# Patient Record
Sex: MALE | URBAN - METROPOLITAN AREA
[De-identification: names, ages, dates, MRNs, and addresses within clinical notes are randomized per-mention and may not be internally consistent; named-entity substitution may affect disease eponyms.]

---

## 2017-09-08 ENCOUNTER — OFFICE VISIT (OUTPATIENT)
Dept: CARDIOLOGY CLINIC | Age: 62
End: 2017-09-08

## 2017-09-08 VITALS — HEART RATE: 62 BPM | DIASTOLIC BLOOD PRESSURE: 96 MMHG | HEIGHT: 71 IN | SYSTOLIC BLOOD PRESSURE: 140 MMHG

## 2017-09-08 DIAGNOSIS — I25.2 OLD MI (MYOCARDIAL INFARCTION): ICD-10-CM

## 2017-09-08 DIAGNOSIS — I10 ESSENTIAL HYPERTENSION: ICD-10-CM

## 2017-09-08 DIAGNOSIS — I25.119 CORONARY ARTERY DISEASE INVOLVING NATIVE CORONARY ARTERY OF NATIVE HEART WITH ANGINA PECTORIS (HCC): ICD-10-CM

## 2017-09-08 DIAGNOSIS — E78.5 HYPERLIPIDEMIA, UNSPECIFIED HYPERLIPIDEMIA TYPE: ICD-10-CM

## 2017-09-08 DIAGNOSIS — R07.9 CHEST PAIN, UNSPECIFIED TYPE: Primary | ICD-10-CM

## 2017-09-08 RX ORDER — METOPROLOL TARTRATE 25 MG/1
12.5 TABLET, FILM COATED ORAL 2 TIMES DAILY
COMMUNITY

## 2017-09-08 RX ORDER — AMLODIPINE BESYLATE 5 MG/1
5 TABLET ORAL DAILY
Qty: 30 TAB | Refills: 1 | Status: SHIPPED | OUTPATIENT
Start: 2017-09-08

## 2017-09-08 RX ORDER — ACETAMINOPHEN 325 MG/1
975 TABLET ORAL 2 TIMES DAILY
COMMUNITY

## 2017-09-08 RX ORDER — ISOSORBIDE MONONITRATE 30 MG/1
30 TABLET, EXTENDED RELEASE ORAL DAILY
COMMUNITY
End: 2017-09-08

## 2017-09-08 RX ORDER — ASPIRIN 81 MG/1
81 TABLET ORAL DAILY
COMMUNITY

## 2017-09-08 RX ORDER — NITROGLYCERIN 0.4 MG/1
TABLET SUBLINGUAL
COMMUNITY

## 2017-09-08 NOTE — PROGRESS NOTES
HISTORY OF PRESENT ILLNESS  Drea Kwong is a 65 y.o. male. Hospital Follow Up   The history is provided by the patient. Associated symptoms include chest pain. Pertinent negatives include no abdominal pain, no headaches and no shortness of breath. Chest Pain (Angina)    The history is provided by the patient. This is a recurrent problem. The problem has not changed since onset. The problem occurs every several days. The pain is associated with exertion and rest. The pain is present in the left side. The pain is mild. The quality of the pain is described as dull. The pain radiates to the left arm (left leg). Pertinent negatives include no abdominal pain, no claudication, no cough, no dizziness, no fever, no headaches, no hemoptysis, no nausea, no orthopnea, no palpitations, no PND, no shortness of breath, no sputum production, no vomiting and no weakness. Review of Systems   Constitutional: Negative for chills and fever. HENT: Negative for nosebleeds. Eyes: Negative for blurred vision and double vision. Respiratory: Negative for cough, hemoptysis, sputum production, shortness of breath and wheezing. Cardiovascular: Positive for chest pain. Negative for palpitations, orthopnea, claudication, leg swelling and PND. Gastrointestinal: Negative for abdominal pain, heartburn, nausea and vomiting. Musculoskeletal: Negative for myalgias. Skin: Negative for rash. Neurological: Negative for dizziness, weakness and headaches. Endo/Heme/Allergies: Does not bruise/bleed easily.      Family History   Problem Relation Age of Onset    No Known Problems Mother     No Known Problems Father        Past Medical History:   Diagnosis Date    CAD (coronary artery disease)     Myocardial infarction ([facility] [address]        Past Surgical History:   Procedure Laterality Date    HX HEART CATHETERIZATION         Social History   Substance Use Topics    Smoking status: Former Smoker     Packs/day: 0.75     Years: 40.00 Quit date: 4/8/2017    Smokeless tobacco: Never Used    Alcohol use 25.2 oz/week     42 Cans of beer per week      Comment: 6 pack/day       No Known Allergies    Prior to Admission medications    Medication Sig Start Date End Date Taking? Authorizing Provider   acetaminophen (TYLENOL) 325 mg tablet Take 975 mg by mouth two (2) times a day. Yes Historical Provider   metoprolol tartrate (LOPRESSOR) 25 mg tablet Take 12.5 mg by mouth two (2) times a day. Yes Historical Provider   nitroglycerin (NITROSTAT) 0.4 mg SL tablet by SubLINGual route every five (5) minutes as needed for Chest Pain. Yes Historical Provider   aspirin delayed-release 81 mg tablet Take 81 mg by mouth daily. Yes Historical Provider   amLODIPine (NORVASC) 5 mg tablet Take 1 Tab by mouth daily. 9/8/17  Yes Maricarmen Verdin MD         Visit Vitals    BP (!) 140/96    Pulse 62    Ht 5' 11\" (1.803 m)         Physical Exam   Constitutional: He is oriented to person, place, and time. He appears well-developed and well-nourished. HENT:   Head: Normocephalic and atraumatic. Eyes: Conjunctivae are normal.   Neck: Neck supple. No JVD present. No tracheal deviation present. No thyromegaly present. Cardiovascular: Normal rate, regular rhythm and normal heart sounds. Exam reveals no gallop and no friction rub. No murmur heard. Pulmonary/Chest: Breath sounds normal. No respiratory distress. He has no wheezes. He has no rales. He exhibits no tenderness. Abdominal: Soft. There is no tenderness. Musculoskeletal: He exhibits no edema. Neurological: He is alert and oriented to person, place, and time. Skin: Skin is warm and dry. Psychiatric: He has a normal mood and affect. Mr. Shashank Moraes has a reminder for a \"due or due soon\" health maintenance. I have asked that he contact his primary care provider for follow-up on this health maintenance.   I have personally reviewed patient's records available from hospital and other providers and incorporated findings in patient care. I have personally reviewed patients ekg done at other facility. 7/2017-sr mina,lateral t inversion  I Have personally reviewed recent relevant labs available and discussed with patient  10/2016 for atypical CP at which time he underwent cardiac catheterization without any PCI indicated-[place]    Assessment         ICD-10-CM ICD-9-CM    1. Chest pain, unspecified type R07.9 786.50 SCHEDULE NUCLEAR STUDY    atypical  / angina     2. Old MI (myocardial infarction) I25.2 412     at [place]  10/2016  cath reported no critical blockages   3. Coronary artery disease involving native coronary artery of native heart with angina pectoris ([facility] [address] I25.119 414.01      413.9     medically managed  severe headache with imdur   4. Essential hypertension I10 401.9     stable   5. Hyperlipidemia, unspecified hyperlipidemia type E78.5 272.4     low hdl  ldl 92       Medications Discontinued During This Encounter   Medication Reason    isosorbide mononitrate ER (IMDUR) 30 mg tablet Other       Orders Placed This Encounter    SCHEDULE NUCLEAR STUDY     lexiscan stress test     Standing Status:   Future     Standing Expiration Date:   9/8/2018    amLODIPine (NORVASC) 5 mg tablet     Sig: Take 1 Tab by mouth daily. Dispense:  30 Tab     Refill:  1       Follow-up Disposition:  Return for F/u after tests.

## 2017-11-03 ENCOUNTER — CLINICAL SUPPORT (OUTPATIENT)
Dept: CARDIOLOGY CLINIC | Age: 62
End: 2017-11-03

## 2017-11-03 DIAGNOSIS — I25.2 OLD MI (MYOCARDIAL INFARCTION): ICD-10-CM

## 2017-11-03 DIAGNOSIS — R07.9 CHEST PAIN, UNSPECIFIED TYPE: Primary | ICD-10-CM

## 2017-11-03 DIAGNOSIS — I10 ESSENTIAL HYPERTENSION: ICD-10-CM

## 2017-11-03 DIAGNOSIS — I25.119 CORONARY ARTERY DISEASE INVOLVING NATIVE CORONARY ARTERY OF NATIVE HEART WITH ANGINA PECTORIS (HCC): ICD-10-CM

## 2017-11-03 NOTE — PROGRESS NOTES
[facility]  [address], [address] [place] [address], [place], [address] [place] [address]  [phone] [place]  [phone] [place]       Name: Veronica Roper         MRN#: [id number]        YOB: 1955   Gender: male Ht:5'11\" Wt:187 lbs       . Date of Rest/Stress Images: 11/3/2017   Referring Physician: No primary care provider on file. Ordering Physician: Mya Ruiz. Sheridan Drummond MD, [facility] [place]  Technologist: Ahsan Pickens. ELISABETH Gupta., C.N.M.T  Diagnosis:  1. Chest pain, unspecified type    2. Old MI (myocardial infarction)    3. Coronary artery disease involving native coronary artery of native heart with angina pectoris ([address]    4. Essential hypertension          Rest/Stress Myoview SPECT Myocardial Perfusion Imaging with  Lexiscan Stress and gated SPECT Imaging      PROCEDURE:      Myocardial perfusion imaging was performed at rest approximately 30 mins following the intravenous injection,(Right hand ) of 12.3 mCi of Tc99m Myoview for evaluation of myocardial function and perfusion at rest.    Baseline Data:    Baseline EKG reveals sinus rhythm with nonspecific ST-T changes. Baseline heart rate is 54. Baseline blood pressure is 98/62. Procedure: The patient was injected with 0.4 mg IV Lexiscan over a 30 second period. The patient had no significant symptoms. Heart rate increased from baseline to a heart rate of 78. Blood pressure increased to 116/72. Electrocardiogram showed baseline ST-T changes during the procedure. No arrhythmias were noted. Diagnosis:   1. Negative EKG portion of Lexiscan stress test.    2. Nuclear imaging report to follow. Pharmacological:  Patient was injected with . 4 mg/mL with Lexiscan intravenously over a period 10 to 20 sec. After pharmacologic stress, the patient was injected intravenously with 37.4 mCi of Tc99m Myoview.  Gating post stress tomographic imaging performed approximately 45 minutes post tracer injection. The data was reconstructed in the short, horizontal long and vertical long axis views and tomographic slices were generated. NUCLEAR IMAGING:    Findings:  1. Post-stress imaging in short axis, horizontal and vertical long axis views reveals a small area of perfusion defect along the inferoapical area and inferobasal wall. 2. Resting images also show persistent fixed defect in all areas. No significant normalization. 3. Gated images show normal left ventricular size, wall motion and systolic function appears normal.   The ejection fraction is 73%. Diagnosis:   1. Probably normal scan. 2. Evidence of a mild fixed defect of inferobasal and inferoapical area noted from this nuclear study. Both are associated with normal wall motion and systolic function, likely suggestive of tissue attenuation. 3. No reversible defect suggesting ischemia noted from his nuclear study. 4. Low risk scan. Thank you for the referral.    E-signed and Interpreting Physician:    Chito Woods.  Areli Oliveira MD, [facility] [place]     Date of interpretation: 11/3/2017  Date of final report: 11/3/2017